# Patient Record
Sex: FEMALE | Race: WHITE | Employment: OTHER | URBAN - METROPOLITAN AREA
[De-identification: names, ages, dates, MRNs, and addresses within clinical notes are randomized per-mention and may not be internally consistent; named-entity substitution may affect disease eponyms.]

---

## 2023-02-23 ENCOUNTER — OFFICE VISIT (OUTPATIENT)
Dept: NEUROLOGY | Age: 83
End: 2023-02-23
Payer: MEDICARE

## 2023-02-23 VITALS
HEIGHT: 62 IN | BODY MASS INDEX: 20.43 KG/M2 | HEART RATE: 65 BPM | WEIGHT: 111 LBS | SYSTOLIC BLOOD PRESSURE: 138 MMHG | DIASTOLIC BLOOD PRESSURE: 76 MMHG

## 2023-02-23 DIAGNOSIS — G47.52 RBD (REM BEHAVIORAL DISORDER): ICD-10-CM

## 2023-02-23 DIAGNOSIS — R29.3 POSTURAL IMBALANCE: ICD-10-CM

## 2023-02-23 DIAGNOSIS — R13.19 DYSPHAGIA, NEUROLOGIC: ICD-10-CM

## 2023-02-23 DIAGNOSIS — G20 PARKINSON DISEASE (HCC): Primary | ICD-10-CM

## 2023-02-23 DIAGNOSIS — K59.01 CONSTIPATION BY DELAYED COLONIC TRANSIT: ICD-10-CM

## 2023-02-23 DIAGNOSIS — R25.1 TREMOR: ICD-10-CM

## 2023-02-23 DIAGNOSIS — R49.8 HYPOPHONIA: ICD-10-CM

## 2023-02-23 DIAGNOSIS — R29.3 ABNORMAL POSTURE: ICD-10-CM

## 2023-02-23 DIAGNOSIS — Z79.899 ENCOUNTER FOR LONG-TERM (CURRENT) USE OF HIGH-RISK MEDICATION: ICD-10-CM

## 2023-02-23 PROCEDURE — G8427 DOCREV CUR MEDS BY ELIG CLIN: HCPCS | Performed by: PSYCHIATRY & NEUROLOGY

## 2023-02-23 PROCEDURE — 1036F TOBACCO NON-USER: CPT | Performed by: PSYCHIATRY & NEUROLOGY

## 2023-02-23 PROCEDURE — G8484 FLU IMMUNIZE NO ADMIN: HCPCS | Performed by: PSYCHIATRY & NEUROLOGY

## 2023-02-23 PROCEDURE — 1123F ACP DISCUSS/DSCN MKR DOCD: CPT | Performed by: PSYCHIATRY & NEUROLOGY

## 2023-02-23 PROCEDURE — G8400 PT W/DXA NO RESULTS DOC: HCPCS | Performed by: PSYCHIATRY & NEUROLOGY

## 2023-02-23 PROCEDURE — 99205 OFFICE O/P NEW HI 60 MIN: CPT | Performed by: PSYCHIATRY & NEUROLOGY

## 2023-02-23 PROCEDURE — 1090F PRES/ABSN URINE INCON ASSESS: CPT | Performed by: PSYCHIATRY & NEUROLOGY

## 2023-02-23 PROCEDURE — G8420 CALC BMI NORM PARAMETERS: HCPCS | Performed by: PSYCHIATRY & NEUROLOGY

## 2023-02-23 RX ORDER — METOPROLOL SUCCINATE 50 MG/1
50 TABLET, EXTENDED RELEASE ORAL DAILY
COMMUNITY

## 2023-02-23 RX ORDER — AMLODIPINE BESYLATE 5 MG/1
5 TABLET ORAL DAILY
COMMUNITY

## 2023-02-23 RX ORDER — DOXYCYCLINE HYCLATE 50 MG/1
50 CAPSULE ORAL 2 TIMES DAILY
COMMUNITY

## 2023-02-23 RX ORDER — CARBIDOPA AND LEVODOPA 50; 200 MG/1; MG/1
TABLET, EXTENDED RELEASE ORAL
Qty: 45 TABLET | Refills: 0 | Status: SHIPPED | OUTPATIENT
Start: 2023-02-23

## 2023-02-23 RX ORDER — TRAZODONE HYDROCHLORIDE 50 MG/1
50 TABLET ORAL NIGHTLY
COMMUNITY

## 2023-02-23 ASSESSMENT — ENCOUNTER SYMPTOMS
RESPIRATORY NEGATIVE: 1
CONSTIPATION: 1
EYES NEGATIVE: 1
BACK PAIN: 1
ALLERGIC/IMMUNOLOGIC NEGATIVE: 1

## 2023-02-23 NOTE — PROGRESS NOTES
02/23/23  Betzy Nik        Chief Complaint:  Chief Complaint   Patient presents with    New Patient     Parkinson's disease             HPI:   Patient is a right - handed, 78 y.o.,,female here for the evaluation/ management of Parkinson's Disease. She was diagnosed with PD in 2017 and she began with left hand tremor about 6 months before her diagnosis. She was initially treated with sinemet CR when she was first diagnosed due to progression. She has been on immediate release sinemet for the past year. Currently taking 1 tab an hour before each meal and 1 at bedtime. She was not on a really scheduled regimen. She also feels that her dose is \"hitting her hard\". She has not tried taking it with food. She is not sleeping well. She takes trazodone and it helps her to fall asleep but she doesn't stay asleep and wakes up around 1 am and 4 am with some other awakenings throughout the night. She endorses vivid dreams but does not think she has any dream enactment. But she sleeps alone. She wakes up rested on most days. Constipation is manageable. She has a BM 3 times a week. She may strain at times but not always. Swallowing and voice is changing. Volume and quality of voice have declined. She feels like she has a lot of throat clearing and like things may not go down easily or all the way. Balance is \"not great\". She can tell it has changed a lot in the past several months. No falls. She gets more tremor when she is cold or nervous. She is doing a \"senior Fitness class\" 3 day a week and runs on a treadmill 3 days a week. She used to be a runner. She has had some pain in the hamstrings and lower back. Mild but aching. New and unsure if it is related to the PD. Patient denies:  dizziness or light headedness,  drooling  hallucinations/ visual illusions or impulse control disorder   recent falls. RBD     RLS        Review of Systems:  Review of Systems   Constitutional: Negative. HENT: Negative. Eyes: Negative. Respiratory: Negative. Cardiovascular: Negative. Gastrointestinal:  Positive for constipation. Endocrine: Negative. Genitourinary: Negative. Musculoskeletal:  Positive for back pain. Skin: Negative. Allergic/Immunologic: Negative. Neurological:  Positive for dizziness, tremors and speech difficulty. Hematological: Negative. Psychiatric/Behavioral:  Positive for sleep disturbance. History reviewed. No pertinent past medical history. History reviewed. No pertinent surgical history. History reviewed. No pertinent family history.     Social History     Socioeconomic History    Marital status:      Spouse name: Not on file    Number of children: Not on file    Years of education: Not on file    Highest education level: Not on file   Occupational History    Not on file   Tobacco Use    Smoking status: Former     Types: Cigarettes    Smokeless tobacco: Never   Substance and Sexual Activity    Alcohol use: Yes    Drug use: Not on file    Sexual activity: Not on file   Other Topics Concern    Not on file   Social History Narrative    Not on file     Social Determinants of Health     Financial Resource Strain: Not on file   Food Insecurity: Not on file   Transportation Needs: Not on file   Physical Activity: Not on file   Stress: Not on file   Social Connections: Not on file   Intimate Partner Violence: Not on file   Housing Stability: Not on file       Current Outpatient Medications on File Prior to Visit   Medication Sig Dispense Refill    traZODone (DESYREL) 50 MG tablet Take 50 mg by mouth nightly      doxycycline (VIBRAMYCIN) 50 MG capsule Take 50 mg by mouth 2 times daily      amLODIPine (NORVASC) 5 MG tablet Take 5 mg by mouth daily      metoprolol succinate (TOPROL XL) 50 MG extended release tablet Take 50 mg by mouth daily      mirabegron (MYRBETRIQ) 50 MG TB24 Take 50 mg by mouth daily      Multiple Vitamin (MULTI-VITAMIN DAILY PO) Take by mouth       No current facility-administered medications on file prior to visit. No Known Allergies        Physical Examination    Vitals:    02/23/23 0858 02/23/23 0903   BP: (!) 148/82 138/76   Position: Sitting Standing   Pulse: 64 65   Weight: 111 lb (50.3 kg)    Height: 5' 2\" (1.575 m)          General: No acute distress  Psychiatric: well oriented, normal mood and affect  Cardiovascular: no peripheral edema, no JVD, no carotid bruits, RRR  Pulmonary: CTAB  Skin: No rashes, lesions or ulcers  Ext: no C/C/E      Neurologic Exam  Patient oriented to Person, Place and Time. Language and fund of knowledge intact. CN:Cranial nerves show normal visual fields, pupils equally reactive to light, extraocular movements are intact, Fundoscopic exam shows no papilledema or other abnormalities. facial sensation  Intact and strength is intact, hearing is normal to finger rubs, palate elevates normally, tongue protrudes midline, shoulder shrug is normal.    Motor:RUE 5/5, RLE 5/5, LUE 5/5, LLE 5/5 ,  normal bulk and tone    Reflexes: Patellar reflexes are +2 , Achilles reflexes are 1+ , toes are downgoing. Sensation: Normal  light touch, temperature and vibration throughout     Cerebellar: FTN, HTS intact    Motor Examination: Last dose of sinemet was 4 hrs ago. Voice tremor     Chin tremor      RIGHT LEFT   Tremor at rest 1 2   Postural Tremor of hands 0 0   Action Tremor of hands 0 0   Tremor of Lower extremity 0 1   Open/Close 0 0   Rapid Alternating Movements of Hands 0 0   Finger Taps 1 1   Rigidity - Upper extremity 1 2   Rigidity- Lower extremity  1 1   Foot tapping/LE agility 1 1     Hypophonia 2   Hypomimia 2   Arising from Chair slow   Posture Shoulders ant rotated   Gait Slower, wider based but shorter stride, let hand tremor and decreased arm swing L>R. Slow turns.  Slightly cautious   Pull test defered   Dyskinesia  absent   Body Bradykinsesia 2     Assessment/Plan:   Diagnosis Orders 1. Parkinson disease ([facility] [address]        2. Tremor        3. Postural imbalance        4. Abnormal posture        5. RBD (REM behavioral disorder)        6. Encounter for long-term (current) use of high-risk medication        7. Hypophonia        8. Dysphagia, neurologic        9. Constipation by delayed colonic transit            Tremor predominant PD that is having breakthrough tremor or refractory tremor, unsure as she seems to think that she never had full control of tremor. She has trouble tolerating the sinemet IR. Did better with CR but was not well controlled. She needs a fixed time for dosing at Q 4 hrs apart. I will let her go back to sinemet CR at 50/200mg instead of 25/100mg. 1 tab Q 4 hrs (6:30am, 10:30am, 2:30pm, 6:30pm and bedtime). She will monitor how she does over the next two weeks and get back to me. We could also consider Rytary or taking sinemet CR with 1/2 tab IR. I will also refer to PT and speech close to her for baseline evaluations and work for posture and balance and hypophonia/mild dysphagia. I have spent greater than 50% of the patient's 60 minute visit  in counseling for importance of exercise,  medications and education of disease and disease progression. Patient is to continue all other medications as directed by prescribing physicians unless addressed above in plan. Continuation of these medications from today's visit are made based on the patient's report of current medications.        Lida Grover MD  [facility] Neurology  Director Movement Disorders Program  [address] [address] [address], [address] [facility]  Phone: [phone]  Fax: [phone]

## 2023-02-27 DIAGNOSIS — G20 PARKINSON DISEASE (HCC): Primary | ICD-10-CM

## 2023-02-27 RX ORDER — CARBIDOPA AND LEVODOPA 50; 200 MG/1; MG/1
TABLET, EXTENDED RELEASE ORAL
Qty: 450 TABLET | Refills: 0 | Status: SHIPPED | OUTPATIENT
Start: 2023-02-27

## 2023-03-10 ENCOUNTER — TELEPHONE (OUTPATIENT)
Dept: NEUROLOGY | Age: 83
End: 2023-03-10

## 2023-03-10 NOTE — TELEPHONE ENCOUNTER
Patient was changed to Sinemet CR 50/200mg QID and she said it hits her like a ton of bricks about 20 min after she takes it. She feels dizzy and her tremors are worse. Please advise.

## 2023-03-10 NOTE — TELEPHONE ENCOUNTER
Attempted to call back. No answer. Left voice message. Suspect the transition from immediate release to CR has been a little much for her. I would probably recommend that she decrease her Sinemet CR to 1 tablet in the morning and 1 tablet in the evening over the next week. If doing better after a week, then she can remain at that dose or if she still feels undertreated, then she can increase it to 1 tablet 3 times a day.

## 2023-03-14 ENCOUNTER — TELEPHONE (OUTPATIENT)
Dept: NEUROLOGY | Age: 83
End: 2023-03-14

## 2023-03-14 DIAGNOSIS — G20 PARKINSON'S DISEASE (HCC): Primary | ICD-10-CM

## 2023-03-14 NOTE — TELEPHONE ENCOUNTER
Patient is still having a lot of tremors, dizziness and the feeling that she is falling. Please advise.

## 2023-03-14 NOTE — TELEPHONE ENCOUNTER
Spoke to patient. Still unhappy and experiencing other adverse effects on the extended release. She reports she was doing better when on the immediate release. So we are going to switch her back to immediate release Sinemet. Sent new Rx for CD/LD  1 tab QID.

## 2023-06-20 ENCOUNTER — OFFICE VISIT (OUTPATIENT)
Dept: NEUROLOGY | Age: 83
End: 2023-06-20

## 2023-06-20 VITALS
SYSTOLIC BLOOD PRESSURE: 136 MMHG | DIASTOLIC BLOOD PRESSURE: 74 MMHG | WEIGHT: 109.2 LBS | HEART RATE: 68 BPM | HEIGHT: 62 IN | BODY MASS INDEX: 20.09 KG/M2 | OXYGEN SATURATION: 97 %

## 2023-06-20 DIAGNOSIS — M54.31 SCIATICA OF RIGHT SIDE: ICD-10-CM

## 2023-06-20 DIAGNOSIS — R49.8 HYPOPHONIA: ICD-10-CM

## 2023-06-20 DIAGNOSIS — G20 PARKINSON'S DISEASE (HCC): ICD-10-CM

## 2023-06-20 DIAGNOSIS — Z79.899 ENCOUNTER FOR LONG-TERM (CURRENT) USE OF HIGH-RISK MEDICATION: ICD-10-CM

## 2023-06-20 DIAGNOSIS — R25.1 TREMOR: Primary | ICD-10-CM

## 2023-06-20 RX ORDER — CARBIDOPA AND LEVODOPA 25; 100 MG/1; MG/1
TABLET, EXTENDED RELEASE ORAL
Qty: 90 TABLET | Refills: 3 | Status: SHIPPED | OUTPATIENT
Start: 2023-06-20

## 2023-06-20 ASSESSMENT — ENCOUNTER SYMPTOMS
CONSTIPATION: 0
BACK PAIN: 1

## 2023-10-20 NOTE — PROGRESS NOTES
10/24/23  Betzy Zaragoza        Chief Complaint:  Chief Complaint   Patient presents with    Follow-up       For Parkinson disease         Parkinson's Disease Diagnosed: in 2017 and she began with left hand tremor about 6 months before her diagnosis      HPI:   Jinny Mcgee, 78 y.o.,female here in clinic follow up for continued management of Parkinson's Disease. She is having a lot of changes in her life with her  who has been diagnosed with dementia and is aggressive and she had to leave her home. They are likely moving to assisted care. Sinemet is not wearing off before the next dose but it getting close. She is not sleeping well due to all the recent changes. She is getting lightheaded when she stands. Swallowing is getting to be a problem. Coughing when she swallows. She is unsure of what causes her to cough. Has not paid attention to what it is. Voice is not changed that much. Current Neuro related meds:  sinemet 25/100mg 1.5 tab (Q 4 hrs) 7am, 11am , 3pm, 7pm.  Sinemet CR 25/100mg 1 tab QHS          Review of Systems   Constitutional:  Positive for appetite change. HENT:  Negative for hearing loss and tinnitus. Eyes:  Positive for visual disturbance. Gastrointestinal:  Positive for constipation. Musculoskeletal:  Positive for back pain. Negative for neck pain. Neurological:  Positive for dizziness, tremors and speech difficulty. Negative for seizures, numbness and headaches. Psychiatric/Behavioral:  Positive for sleep disturbance. Negative for hallucinations. The patient is nervous/anxious. History reviewed. No pertinent past medical history. History reviewed. No pertinent surgical history. History reviewed. No pertinent family history.     Social History     Socioeconomic History    Marital status:      Spouse name: Not on file    Number of children: Not on file    Years of education: Not on file    Highest education level: Not on file

## 2023-10-24 ENCOUNTER — OFFICE VISIT (OUTPATIENT)
Dept: NEUROLOGY | Age: 83
End: 2023-10-24
Payer: MEDICARE

## 2023-10-24 VITALS
DIASTOLIC BLOOD PRESSURE: 64 MMHG | OXYGEN SATURATION: 97 % | WEIGHT: 103 LBS | SYSTOLIC BLOOD PRESSURE: 102 MMHG | BODY MASS INDEX: 18.84 KG/M2 | HEART RATE: 59 BPM

## 2023-10-24 DIAGNOSIS — R29.3 POSTURAL IMBALANCE: ICD-10-CM

## 2023-10-24 DIAGNOSIS — G20.A1 PARKINSON'S DISEASE WITHOUT DYSKINESIA OR FLUCTUATING MANIFESTATIONS: Primary | ICD-10-CM

## 2023-10-24 DIAGNOSIS — G90.3 NEUROLOGIC ORTHOSTATIC HYPOTENSION (HCC): ICD-10-CM

## 2023-10-24 DIAGNOSIS — R49.8 HYPOPHONIA: ICD-10-CM

## 2023-10-24 DIAGNOSIS — R25.1 TREMOR: ICD-10-CM

## 2023-10-24 DIAGNOSIS — R13.19 DYSPHAGIA, NEUROLOGIC: ICD-10-CM

## 2023-10-24 PROCEDURE — G8420 CALC BMI NORM PARAMETERS: HCPCS | Performed by: PSYCHIATRY & NEUROLOGY

## 2023-10-24 PROCEDURE — 1090F PRES/ABSN URINE INCON ASSESS: CPT | Performed by: PSYCHIATRY & NEUROLOGY

## 2023-10-24 PROCEDURE — G8484 FLU IMMUNIZE NO ADMIN: HCPCS | Performed by: PSYCHIATRY & NEUROLOGY

## 2023-10-24 PROCEDURE — G8427 DOCREV CUR MEDS BY ELIG CLIN: HCPCS | Performed by: PSYCHIATRY & NEUROLOGY

## 2023-10-24 PROCEDURE — G8400 PT W/DXA NO RESULTS DOC: HCPCS | Performed by: PSYCHIATRY & NEUROLOGY

## 2023-10-24 PROCEDURE — 99215 OFFICE O/P EST HI 40 MIN: CPT | Performed by: PSYCHIATRY & NEUROLOGY

## 2023-10-24 PROCEDURE — 1123F ACP DISCUSS/DSCN MKR DOCD: CPT | Performed by: PSYCHIATRY & NEUROLOGY

## 2023-10-24 PROCEDURE — 1036F TOBACCO NON-USER: CPT | Performed by: PSYCHIATRY & NEUROLOGY

## 2023-10-24 ASSESSMENT — PATIENT HEALTH QUESTIONNAIRE - PHQ9
DEPRESSION UNABLE TO ASSESS: FUNCTIONAL CAPACITY MOTIVATION LIMITS ACCURACY
SUM OF ALL RESPONSES TO PHQ QUESTIONS 1-9: 0
2. FEELING DOWN, DEPRESSED OR HOPELESS: 0
SUM OF ALL RESPONSES TO PHQ QUESTIONS 1-9: 0
1. LITTLE INTEREST OR PLEASURE IN DOING THINGS: 0
SUM OF ALL RESPONSES TO PHQ9 QUESTIONS 1 & 2: 0

## 2023-10-24 ASSESSMENT — ENCOUNTER SYMPTOMS
BACK PAIN: 1
CONSTIPATION: 1

## 2024-04-18 NOTE — PROGRESS NOTES
04/23/24  Betzy Zaragoza        Chief Complaint:  Chief Complaint   Patient presents with    Follow-up       For Parkinson disease         Parkinson's Disease Diagnosed:  in 2017 and she began with left hand tremor about 6 months before her diagnosis       HPI:   Betzy Zaragoza, 83 y.o.,female here in clinic follow up for continued management of Parkinson's Disease. She is now at the [facility] in [place] with her spouse. Her  is very unhappy with moving and depressed and this makes her depressed. Her  refuses a lot of medical care. In December he had a intracranial hemorrhage that was drained. Now he refuses to get repeat scans. Cognitively he is not intact. He blames her for his lose of home, freedom. This impacts her and her PD significantly.     They are in the same building but in different areas, he is in memory care. But they spend all day together and wife is having to stay to provide care as she feels that they wont do the things he needs.     Sinemet working for her but she gets tremor just after taking it. The increase dose in 1.5 tabs made her dizzy for a time. She is getting her pills on time. Her stress impacts her motor symptoms.   She is sleeping well. She does feel llike she is in a fog all day and goes to bed since there is nothing to do.     Current Neuro related meds:  Sinemet 25/100mg 1.5 tab (Q 4 hrs) 7am, 11am , 3pm, 7pm.  Sinemet CR 25/100mg 1 tab QHS  Mirtazipine 7.5mg 1 tab QHS  Trazadone 50mg 1 QHS               Review of Systems   Constitutional:  Negative for appetite change.   HENT:  Positive for hearing loss. Negative for tinnitus.    Eyes:  Negative for visual disturbance.   Gastrointestinal:  Positive for constipation.   Musculoskeletal:  Positive for neck pain. Negative for back pain.   Neurological:  Positive for dizziness and tremors. Negative for seizures, speech difficulty, numbness and headaches.   Psychiatric/Behavioral:  Positive for hallucinations and sleep

## 2024-04-23 ENCOUNTER — OFFICE VISIT (OUTPATIENT)
Dept: NEUROLOGY | Age: 84
End: 2024-04-23
Payer: MEDICARE

## 2024-04-23 VITALS
WEIGHT: 124 LBS | DIASTOLIC BLOOD PRESSURE: 72 MMHG | BODY MASS INDEX: 22.68 KG/M2 | HEART RATE: 60 BPM | OXYGEN SATURATION: 96 % | SYSTOLIC BLOOD PRESSURE: 158 MMHG

## 2024-04-23 DIAGNOSIS — Z79.899 ENCOUNTER FOR LONG-TERM (CURRENT) USE OF HIGH-RISK MEDICATION: ICD-10-CM

## 2024-04-23 DIAGNOSIS — G20.A1 PARKINSON'S DISEASE WITHOUT DYSKINESIA OR FLUCTUATING MANIFESTATIONS (HCC): Primary | ICD-10-CM

## 2024-04-23 DIAGNOSIS — R25.1 TREMOR: ICD-10-CM

## 2024-04-23 DIAGNOSIS — F41.8 MIXED ANXIETY AND DEPRESSIVE DISORDER: ICD-10-CM

## 2024-04-23 PROCEDURE — 1036F TOBACCO NON-USER: CPT | Performed by: PSYCHIATRY & NEUROLOGY

## 2024-04-23 PROCEDURE — G8400 PT W/DXA NO RESULTS DOC: HCPCS | Performed by: PSYCHIATRY & NEUROLOGY

## 2024-04-23 PROCEDURE — 1123F ACP DISCUSS/DSCN MKR DOCD: CPT | Performed by: PSYCHIATRY & NEUROLOGY

## 2024-04-23 PROCEDURE — G8427 DOCREV CUR MEDS BY ELIG CLIN: HCPCS | Performed by: PSYCHIATRY & NEUROLOGY

## 2024-04-23 PROCEDURE — G8420 CALC BMI NORM PARAMETERS: HCPCS | Performed by: PSYCHIATRY & NEUROLOGY

## 2024-04-23 PROCEDURE — 99215 OFFICE O/P EST HI 40 MIN: CPT | Performed by: PSYCHIATRY & NEUROLOGY

## 2024-04-23 PROCEDURE — G2211 COMPLEX E/M VISIT ADD ON: HCPCS | Performed by: PSYCHIATRY & NEUROLOGY

## 2024-04-23 PROCEDURE — 1090F PRES/ABSN URINE INCON ASSESS: CPT | Performed by: PSYCHIATRY & NEUROLOGY

## 2024-04-23 RX ORDER — METOPROLOL TARTRATE 50 MG/1
50 TABLET, FILM COATED ORAL 2 TIMES DAILY
COMMUNITY

## 2024-04-23 RX ORDER — SERTRALINE HYDROCHLORIDE 25 MG/1
TABLET, FILM COATED ORAL
Qty: 180 TABLET | Refills: 3 | Status: SHIPPED | OUTPATIENT
Start: 2024-04-23

## 2024-04-23 RX ORDER — LOPERAMIDE HYDROCHLORIDE 2 MG/1
2 CAPSULE ORAL 4 TIMES DAILY PRN
COMMUNITY
Start: 2024-02-27

## 2024-04-23 RX ORDER — MIRTAZAPINE 7.5 MG/1
7.5 TABLET, FILM COATED ORAL DAILY
COMMUNITY
Start: 2024-04-11

## 2024-04-23 ASSESSMENT — ENCOUNTER SYMPTOMS
BACK PAIN: 0
CONSTIPATION: 1

## 2024-08-19 DIAGNOSIS — G20.A1 PARKINSON'S DISEASE (HCC): ICD-10-CM

## 2024-08-19 NOTE — TELEPHONE ENCOUNTER
Patient needs a refill of Sinemet 25/100mg faxed to the [facility]. The fax number is [phone]. Phone: [phone]

## 2024-08-21 RX ORDER — CARBIDOPA AND LEVODOPA 25; 100 MG/1; MG/1
TABLET, EXTENDED RELEASE ORAL
Qty: 90 TABLET | Refills: 3 | Status: SHIPPED | OUTPATIENT
Start: 2024-08-21

## 2024-09-03 ENCOUNTER — OFFICE VISIT (OUTPATIENT)
Dept: NEUROLOGY | Age: 84
End: 2024-09-03
Payer: MEDICARE

## 2024-09-03 VITALS
DIASTOLIC BLOOD PRESSURE: 79 MMHG | WEIGHT: 134 LBS | BODY MASS INDEX: 24.51 KG/M2 | HEART RATE: 55 BPM | SYSTOLIC BLOOD PRESSURE: 188 MMHG | OXYGEN SATURATION: 95 %

## 2024-09-03 DIAGNOSIS — G20.A1 PARKINSON'S DISEASE WITHOUT DYSKINESIA OR FLUCTUATING MANIFESTATIONS (HCC): ICD-10-CM

## 2024-09-03 DIAGNOSIS — G47.52 RBD (REM BEHAVIORAL DISORDER): Primary | ICD-10-CM

## 2024-09-03 DIAGNOSIS — Z79.899 ENCOUNTER FOR LONG-TERM (CURRENT) USE OF HIGH-RISK MEDICATION: ICD-10-CM

## 2024-09-03 DIAGNOSIS — R25.1 TREMOR: ICD-10-CM

## 2024-09-03 DIAGNOSIS — F41.1 GAD (GENERALIZED ANXIETY DISORDER): ICD-10-CM

## 2024-09-03 DIAGNOSIS — G20.A2 PARKINSON'S DISEASE WITHOUT DYSKINESIA, WITH FLUCTUATING MANIFESTATIONS (HCC): ICD-10-CM

## 2024-09-03 PROCEDURE — 1123F ACP DISCUSS/DSCN MKR DOCD: CPT | Performed by: PSYCHIATRY & NEUROLOGY

## 2024-09-03 PROCEDURE — 1036F TOBACCO NON-USER: CPT | Performed by: PSYCHIATRY & NEUROLOGY

## 2024-09-03 PROCEDURE — 99215 OFFICE O/P EST HI 40 MIN: CPT | Performed by: PSYCHIATRY & NEUROLOGY

## 2024-09-03 PROCEDURE — G2211 COMPLEX E/M VISIT ADD ON: HCPCS | Performed by: PSYCHIATRY & NEUROLOGY

## 2024-09-03 PROCEDURE — G8420 CALC BMI NORM PARAMETERS: HCPCS | Performed by: PSYCHIATRY & NEUROLOGY

## 2024-09-03 PROCEDURE — 1090F PRES/ABSN URINE INCON ASSESS: CPT | Performed by: PSYCHIATRY & NEUROLOGY

## 2024-09-03 PROCEDURE — G8427 DOCREV CUR MEDS BY ELIG CLIN: HCPCS | Performed by: PSYCHIATRY & NEUROLOGY

## 2024-09-03 RX ORDER — CARBIDOPA AND LEVODOPA 25; 100 MG/1; MG/1
TABLET, EXTENDED RELEASE ORAL
Qty: 90 TABLET | Refills: 3 | Status: SHIPPED | OUTPATIENT
Start: 2024-09-03

## 2024-09-03 RX ORDER — METOPROLOL SUCCINATE 50 MG/1
50 TABLET, EXTENDED RELEASE ORAL DAILY
COMMUNITY
Start: 2024-08-15

## 2024-09-03 RX ORDER — CLONAZEPAM 0.5 MG/1
TABLET ORAL
Qty: 90 TABLET | Refills: 1 | Status: SHIPPED | OUTPATIENT
Start: 2024-09-03 | End: 2024-12-03

## 2024-09-03 RX ORDER — OMEGA-3-ACID ETHYL ESTERS 1 G/1
1 CAPSULE, LIQUID FILLED ORAL DAILY
COMMUNITY
Start: 2024-08-27

## 2024-09-03 RX ORDER — LACTOSE-REDUCED FOOD
LIQUID (ML) ORAL
COMMUNITY
Start: 2024-07-17

## 2024-09-03 RX ORDER — TRAZODONE HYDROCHLORIDE 50 MG/1
TABLET, FILM COATED ORAL
Qty: 90 TABLET | Refills: 3 | Status: SHIPPED | OUTPATIENT
Start: 2024-09-03

## 2024-09-03 RX ORDER — CARBIDOPA AND LEVODOPA 25; 100 MG/1; MG/1
TABLET ORAL
Qty: 720 TABLET | Refills: 3 | Status: SHIPPED | OUTPATIENT
Start: 2024-09-03

## 2024-09-03 ASSESSMENT — ENCOUNTER SYMPTOMS
BACK PAIN: 0
CONSTIPATION: 0

## 2024-09-03 NOTE — PROGRESS NOTES
09/03/24  Betzy Zaragoza        Chief Complaint:  Chief Complaint   Patient presents with    Follow-up       For Parkinson disease         Parkinson's Disease Diagnosed: in 2017 and she began with left hand tremor about 6 months before her diagnosis     Resides at [facility]       HPI:   Betzy Zaragoza, 84 y.o.,female here in clinic follow up for continued management of Parkinson's Disease. She is having some wearing off btw doses. She is feeling really lightheadedness. She is getting anxiety an hour before her next dose. She also has more tremor then.   She is sleeping well now. She wakes up feeling rested. But she is waking up very early since she is getting her bedtime meds at 7-8pm and so going to bed so early she wakes up early.   Balance is stable. Swallowing may have a hard time going down at times but no choking.   She has some complaints that she is talking in her sleep and waking up neighbors. She is seeing little animals in her room during the night or early morning.       Current Neuro related meds:  Sinemet 25/100mg 1.5 tab (Q 4 hrs) 8am, 12pm , 4pm, 8pm.  Sinemet CR 25/100mg 1 tab QHS  Mirtazipine 7.5mg 1 tab QHS  Trazadone 50mg 1 QHS  Sertraline 50mg QHS               Review of Systems:  Review of Systems   Constitutional:  Positive for appetite change.   HENT:  Negative for hearing loss and tinnitus.    Eyes:  Negative for visual disturbance.   Gastrointestinal:  Negative for constipation.   Musculoskeletal:  Positive for neck pain. Negative for back pain.   Neurological:  Positive for dizziness, tremors and speech difficulty. Negative for seizures, numbness and headaches.   Psychiatric/Behavioral:  Positive for hallucinations and sleep disturbance. The patient is nervous/anxious.          History reviewed. No pertinent surgical history.    History reviewed. No pertinent family history.    Social History     Socioeconomic History    Marital status:      Spouse name: Not on file

## 2024-09-17 ENCOUNTER — TELEPHONE (OUTPATIENT)
Dept: NEUROLOGY | Age: 84
End: 2024-09-17

## 2024-09-17 DIAGNOSIS — G47.52 RBD (REM BEHAVIORAL DISORDER): ICD-10-CM

## 2024-09-17 RX ORDER — CLONAZEPAM 0.5 MG/1
TABLET ORAL
Qty: 45 TABLET | Refills: 1 | Status: SHIPPED | OUTPATIENT
Start: 2024-09-17 | End: 2024-12-17

## 2024-09-24 ENCOUNTER — TELEPHONE (OUTPATIENT)
Dept: NEUROLOGY | Age: 84
End: 2024-09-24

## 2024-09-26 ENCOUNTER — TELEPHONE (OUTPATIENT)
Dept: NEUROLOGY | Age: 84
End: 2024-09-26